# Patient Record
Sex: MALE | Race: BLACK OR AFRICAN AMERICAN | NOT HISPANIC OR LATINO | URBAN - METROPOLITAN AREA
[De-identification: names, ages, dates, MRNs, and addresses within clinical notes are randomized per-mention and may not be internally consistent; named-entity substitution may affect disease eponyms.]

---

## 2017-04-16 ENCOUNTER — EMERGENCY (EMERGENCY)
Facility: HOSPITAL | Age: 37
LOS: 1 days | Discharge: HOME | End: 2017-04-16
Attending: EMERGENCY MEDICINE | Admitting: EMERGENCY MEDICINE
Payer: MEDICAID

## 2017-04-16 VITALS
SYSTOLIC BLOOD PRESSURE: 147 MMHG | DIASTOLIC BLOOD PRESSURE: 87 MMHG | OXYGEN SATURATION: 99 % | RESPIRATION RATE: 18 BRPM | HEART RATE: 108 BPM | TEMPERATURE: 98 F

## 2017-04-16 DIAGNOSIS — Z79.2 LONG TERM (CURRENT) USE OF ANTIBIOTICS: ICD-10-CM

## 2017-04-16 DIAGNOSIS — S33.5XXA SPRAIN OF LIGAMENTS OF LUMBAR SPINE, INITIAL ENCOUNTER: ICD-10-CM

## 2017-04-16 DIAGNOSIS — F17.200 NICOTINE DEPENDENCE, UNSPECIFIED, UNCOMPLICATED: ICD-10-CM

## 2017-04-16 DIAGNOSIS — R10.9 UNSPECIFIED ABDOMINAL PAIN: ICD-10-CM

## 2017-04-16 PROCEDURE — 99283 EMERGENCY DEPT VISIT LOW MDM: CPT | Mod: 25
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
## 2017-04-16 NOTE — ED PROVIDER NOTE - MEDICAL DECISION MAKING DETAILS
pt with chronic back pain from  old wound, pt. admit not symptomatic at this time , need d/c, paper for readmission into shelter.

## 2017-05-17 ENCOUNTER — EMERGENCY (EMERGENCY)
Facility: HOSPITAL | Age: 37
LOS: 1 days | Discharge: LEFT BEFORE DISCHARGE | End: 2017-05-17
Attending: EMERGENCY MEDICINE | Admitting: EMERGENCY MEDICINE

## 2017-05-17 VITALS — TEMPERATURE: 98 F

## 2017-05-17 VITALS
DIASTOLIC BLOOD PRESSURE: 88 MMHG | HEART RATE: 113 BPM | OXYGEN SATURATION: 99 % | RESPIRATION RATE: 16 BRPM | SYSTOLIC BLOOD PRESSURE: 144 MMHG

## 2017-05-17 DIAGNOSIS — R07.89 OTHER CHEST PAIN: ICD-10-CM

## 2017-05-17 RX ORDER — QUETIAPINE FUMARATE 200 MG/1
0 TABLET, FILM COATED ORAL
Qty: 0 | Refills: 0 | COMMUNITY

## 2017-05-17 RX ORDER — DIVALPROEX SODIUM 500 MG/1
0 TABLET, DELAYED RELEASE ORAL
Qty: 0 | Refills: 0 | COMMUNITY
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
## 2017-06-20 ENCOUNTER — EMERGENCY (EMERGENCY)
Facility: HOSPITAL | Age: 37
LOS: 1 days | Discharge: HOME | End: 2017-06-20
Attending: EMERGENCY MEDICINE | Admitting: EMERGENCY MEDICINE
Payer: MEDICAID

## 2017-06-20 VITALS
HEIGHT: 69 IN | SYSTOLIC BLOOD PRESSURE: 121 MMHG | OXYGEN SATURATION: 97 % | HEART RATE: 69 BPM | WEIGHT: 189.6 LBS | DIASTOLIC BLOOD PRESSURE: 74 MMHG | TEMPERATURE: 99 F | RESPIRATION RATE: 15 BRPM

## 2017-06-20 DIAGNOSIS — Z79.899 OTHER LONG TERM (CURRENT) DRUG THERAPY: ICD-10-CM

## 2017-06-20 DIAGNOSIS — R41.82 ALTERED MENTAL STATUS, UNSPECIFIED: ICD-10-CM

## 2017-06-20 DIAGNOSIS — F10.129 ALCOHOL ABUSE WITH INTOXICATION, UNSPECIFIED: ICD-10-CM

## 2017-06-20 PROCEDURE — 99283 EMERGENCY DEPT VISIT LOW MDM: CPT

## 2017-06-21 VITALS
SYSTOLIC BLOOD PRESSURE: 131 MMHG | RESPIRATION RATE: 18 BRPM | DIASTOLIC BLOOD PRESSURE: 82 MMHG | TEMPERATURE: 98 F | OXYGEN SATURATION: 98 % | HEART RATE: 72 BPM
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
## 2017-07-11 ENCOUNTER — INPATIENT (INPATIENT)
Facility: HOSPITAL | Age: 37
LOS: 0 days | Discharge: LEFT BEFORE DISCHARGE | DRG: 178 | End: 2017-07-12
Attending: STUDENT IN AN ORGANIZED HEALTH CARE EDUCATION/TRAINING PROGRAM | Admitting: STUDENT IN AN ORGANIZED HEALTH CARE EDUCATION/TRAINING PROGRAM
Payer: COMMERCIAL

## 2017-07-11 VITALS
OXYGEN SATURATION: 96 % | SYSTOLIC BLOOD PRESSURE: 142 MMHG | DIASTOLIC BLOOD PRESSURE: 90 MMHG | HEART RATE: 135 BPM | RESPIRATION RATE: 14 BRPM | TEMPERATURE: 102 F

## 2017-07-11 DIAGNOSIS — R63.8 OTHER SYMPTOMS AND SIGNS CONCERNING FOOD AND FLUID INTAKE: ICD-10-CM

## 2017-07-11 DIAGNOSIS — Z29.9 ENCOUNTER FOR PROPHYLACTIC MEASURES, UNSPECIFIED: ICD-10-CM

## 2017-07-11 DIAGNOSIS — R41.82 ALTERED MENTAL STATUS, UNSPECIFIED: ICD-10-CM

## 2017-07-11 DIAGNOSIS — F31.9 BIPOLAR DISORDER, UNSPECIFIED: ICD-10-CM

## 2017-07-11 DIAGNOSIS — N17.9 ACUTE KIDNEY FAILURE, UNSPECIFIED: ICD-10-CM

## 2017-07-11 DIAGNOSIS — J69.0 PNEUMONITIS DUE TO INHALATION OF FOOD AND VOMIT: ICD-10-CM

## 2017-07-11 DIAGNOSIS — F19.10 OTHER PSYCHOACTIVE SUBSTANCE ABUSE, UNCOMPLICATED: ICD-10-CM

## 2017-07-11 LAB
ALBUMIN SERPL ELPH-MCNC: 3.7 G/DL — SIGNIFICANT CHANGE UP (ref 3.4–5)
ALP SERPL-CCNC: 74 U/L — SIGNIFICANT CHANGE UP (ref 40–120)
ALT FLD-CCNC: 16 U/L — SIGNIFICANT CHANGE UP (ref 12–42)
ANION GAP SERPL CALC-SCNC: 6 MMOL/L — LOW (ref 9–16)
APPEARANCE UR: CLEAR — SIGNIFICANT CHANGE UP
APTT BLD: 25.1 SEC — LOW (ref 27.5–36.5)
AST SERPL-CCNC: 24 U/L — SIGNIFICANT CHANGE UP (ref 15–37)
BASOPHILS NFR BLD AUTO: 0.3 % — SIGNIFICANT CHANGE UP (ref 0–2)
BILIRUB SERPL-MCNC: 0.3 MG/DL — SIGNIFICANT CHANGE UP (ref 0.2–1.2)
BILIRUB UR-MCNC: NEGATIVE — SIGNIFICANT CHANGE UP
BUN SERPL-MCNC: 14 MG/DL — SIGNIFICANT CHANGE UP (ref 7–23)
CALCIUM SERPL-MCNC: 8.5 MG/DL — SIGNIFICANT CHANGE UP (ref 8.5–10.5)
CHLORIDE SERPL-SCNC: 108 MMOL/L — SIGNIFICANT CHANGE UP (ref 96–108)
CO2 SERPL-SCNC: 28 MMOL/L — SIGNIFICANT CHANGE UP (ref 22–31)
COLOR SPEC: YELLOW — SIGNIFICANT CHANGE UP
CREAT SERPL-MCNC: 1.42 MG/DL — HIGH (ref 0.5–1.3)
DIFF PNL FLD: NEGATIVE — SIGNIFICANT CHANGE UP
EOSINOPHIL NFR BLD AUTO: 1.6 % — SIGNIFICANT CHANGE UP (ref 0–6)
GLUCOSE SERPL-MCNC: 108 MG/DL — HIGH (ref 70–99)
GLUCOSE UR QL: NEGATIVE — SIGNIFICANT CHANGE UP
HCT VFR BLD CALC: 41.4 % — SIGNIFICANT CHANGE UP (ref 39–50)
HGB BLD-MCNC: 14.6 G/DL — SIGNIFICANT CHANGE UP (ref 13–17)
IMM GRANULOCYTES NFR BLD AUTO: 0.5 % — SIGNIFICANT CHANGE UP (ref 0–1.5)
INR BLD: 0.96 — SIGNIFICANT CHANGE UP (ref 0.88–1.16)
KETONES UR-MCNC: NEGATIVE — SIGNIFICANT CHANGE UP
LACTATE SERPL-SCNC: 1.5 MMOL/L — SIGNIFICANT CHANGE UP (ref 0.4–2)
LEUKOCYTE ESTERASE UR-ACNC: NEGATIVE — SIGNIFICANT CHANGE UP
LIDOCAIN IGE QN: 106 U/L — SIGNIFICANT CHANGE UP (ref 73–393)
LYMPHOCYTES # BLD AUTO: 26.3 % — SIGNIFICANT CHANGE UP (ref 13–44)
MCHC RBC-ENTMCNC: 30.8 PG — SIGNIFICANT CHANGE UP (ref 27–34)
MCHC RBC-ENTMCNC: 35.3 G/DL — SIGNIFICANT CHANGE UP (ref 32–36)
MCV RBC AUTO: 87.3 FL — SIGNIFICANT CHANGE UP (ref 80–100)
MONOCYTES NFR BLD AUTO: 3.8 % — SIGNIFICANT CHANGE UP (ref 2–14)
NEUTROPHILS NFR BLD AUTO: 67.5 % — SIGNIFICANT CHANGE UP (ref 43–77)
NITRITE UR-MCNC: NEGATIVE — SIGNIFICANT CHANGE UP
PCP SPEC-MCNC: SIGNIFICANT CHANGE UP
PH UR: 5.5 — SIGNIFICANT CHANGE UP (ref 5–8)
PLATELET # BLD AUTO: 209 K/UL — SIGNIFICANT CHANGE UP (ref 150–400)
POTASSIUM SERPL-MCNC: 3.7 MMOL/L — SIGNIFICANT CHANGE UP (ref 3.5–5.3)
POTASSIUM SERPL-SCNC: 3.7 MMOL/L — SIGNIFICANT CHANGE UP (ref 3.5–5.3)
PROT SERPL-MCNC: 6.8 G/DL — SIGNIFICANT CHANGE UP (ref 6.4–8.2)
PROT UR-MCNC: NEGATIVE MG/DL — SIGNIFICANT CHANGE UP
PROTHROM AB SERPL-ACNC: 10.6 SEC — SIGNIFICANT CHANGE UP (ref 9.8–12.7)
RBC # BLD: 4.74 M/UL — SIGNIFICANT CHANGE UP (ref 4.2–5.8)
RBC # FLD: 12.2 % — SIGNIFICANT CHANGE UP (ref 10.3–16.9)
SODIUM SERPL-SCNC: 142 MMOL/L — SIGNIFICANT CHANGE UP (ref 132–145)
SP GR SPEC: 1.02 — SIGNIFICANT CHANGE UP (ref 1–1.03)
UROBILINOGEN FLD QL: 0.2 E.U./DL — SIGNIFICANT CHANGE UP
WBC # BLD: 5.7 K/UL — SIGNIFICANT CHANGE UP (ref 3.8–10.5)
WBC # FLD AUTO: 5.7 K/UL — SIGNIFICANT CHANGE UP (ref 3.8–10.5)

## 2017-07-11 PROCEDURE — 93010 ELECTROCARDIOGRAM REPORT: CPT

## 2017-07-11 PROCEDURE — 99222 1ST HOSP IP/OBS MODERATE 55: CPT | Mod: GC

## 2017-07-11 PROCEDURE — 99291 CRITICAL CARE FIRST HOUR: CPT | Mod: 25

## 2017-07-11 PROCEDURE — 71250 CT THORAX DX C-: CPT | Mod: 26

## 2017-07-11 PROCEDURE — 70450 CT HEAD/BRAIN W/O DYE: CPT | Mod: 26

## 2017-07-11 RX ORDER — SODIUM CHLORIDE 9 MG/ML
1000 INJECTION INTRAMUSCULAR; INTRAVENOUS; SUBCUTANEOUS
Qty: 0 | Refills: 0 | Status: DISCONTINUED | OUTPATIENT
Start: 2017-07-11 | End: 2017-07-12

## 2017-07-11 RX ORDER — SODIUM CHLORIDE 9 MG/ML
3 INJECTION INTRAMUSCULAR; INTRAVENOUS; SUBCUTANEOUS ONCE
Qty: 0 | Refills: 0 | Status: COMPLETED | OUTPATIENT
Start: 2017-07-11 | End: 2017-07-11

## 2017-07-11 RX ORDER — SODIUM CHLORIDE 9 MG/ML
500 INJECTION INTRAMUSCULAR; INTRAVENOUS; SUBCUTANEOUS ONCE
Qty: 0 | Refills: 0 | Status: COMPLETED | OUTPATIENT
Start: 2017-07-11 | End: 2017-07-11

## 2017-07-11 RX ORDER — SODIUM CHLORIDE 9 MG/ML
500 INJECTION INTRAMUSCULAR; INTRAVENOUS; SUBCUTANEOUS ONCE
Qty: 0 | Refills: 0 | Status: COMPLETED | OUTPATIENT
Start: 2017-07-11 | End: 2017-07-12

## 2017-07-11 RX ORDER — SODIUM CHLORIDE 9 MG/ML
500 INJECTION INTRAMUSCULAR; INTRAVENOUS; SUBCUTANEOUS
Qty: 0 | Refills: 0 | Status: COMPLETED | OUTPATIENT
Start: 2017-07-11 | End: 2017-07-11

## 2017-07-11 RX ORDER — HEPARIN SODIUM 5000 [USP'U]/ML
5000 INJECTION INTRAVENOUS; SUBCUTANEOUS EVERY 8 HOURS
Qty: 0 | Refills: 0 | Status: DISCONTINUED | OUTPATIENT
Start: 2017-07-11 | End: 2017-07-12

## 2017-07-11 RX ORDER — ACETAMINOPHEN 500 MG
650 TABLET ORAL EVERY 6 HOURS
Qty: 0 | Refills: 0 | Status: DISCONTINUED | OUTPATIENT
Start: 2017-07-11 | End: 2017-07-12

## 2017-07-11 RX ORDER — AMPICILLIN SODIUM AND SULBACTAM SODIUM 250; 125 MG/ML; MG/ML
INJECTION, POWDER, FOR SUSPENSION INTRAMUSCULAR; INTRAVENOUS
Qty: 0 | Refills: 0 | Status: DISCONTINUED | OUTPATIENT
Start: 2017-07-11 | End: 2017-07-12

## 2017-07-11 RX ORDER — ACETAMINOPHEN 500 MG
650 TABLET ORAL ONCE
Qty: 0 | Refills: 0 | Status: COMPLETED | OUTPATIENT
Start: 2017-07-11 | End: 2017-07-11

## 2017-07-11 RX ORDER — PIPERACILLIN AND TAZOBACTAM 4; .5 G/20ML; G/20ML
3.38 INJECTION, POWDER, LYOPHILIZED, FOR SOLUTION INTRAVENOUS ONCE
Qty: 0 | Refills: 0 | Status: COMPLETED | OUTPATIENT
Start: 2017-07-11 | End: 2017-07-11

## 2017-07-11 RX ORDER — AMPICILLIN SODIUM AND SULBACTAM SODIUM 250; 125 MG/ML; MG/ML
3 INJECTION, POWDER, FOR SUSPENSION INTRAMUSCULAR; INTRAVENOUS EVERY 6 HOURS
Qty: 0 | Refills: 0 | Status: DISCONTINUED | OUTPATIENT
Start: 2017-07-12 | End: 2017-07-12

## 2017-07-11 RX ORDER — AMPICILLIN SODIUM AND SULBACTAM SODIUM 250; 125 MG/ML; MG/ML
3 INJECTION, POWDER, FOR SUSPENSION INTRAMUSCULAR; INTRAVENOUS ONCE
Qty: 0 | Refills: 0 | Status: COMPLETED | OUTPATIENT
Start: 2017-07-11 | End: 2017-07-11

## 2017-07-11 RX ORDER — MIDAZOLAM HYDROCHLORIDE 1 MG/ML
2 INJECTION, SOLUTION INTRAMUSCULAR; INTRAVENOUS ONCE
Qty: 0 | Refills: 0 | Status: DISCONTINUED | OUTPATIENT
Start: 2017-07-11 | End: 2017-07-11

## 2017-07-11 RX ORDER — VANCOMYCIN HCL 1 G
1000 VIAL (EA) INTRAVENOUS ONCE
Qty: 0 | Refills: 0 | Status: COMPLETED | OUTPATIENT
Start: 2017-07-11 | End: 2017-07-11

## 2017-07-11 RX ADMIN — SODIUM CHLORIDE 2000 MILLILITER(S): 9 INJECTION INTRAMUSCULAR; INTRAVENOUS; SUBCUTANEOUS at 16:01

## 2017-07-11 RX ADMIN — SODIUM CHLORIDE 2000 MILLILITER(S): 9 INJECTION INTRAMUSCULAR; INTRAVENOUS; SUBCUTANEOUS at 15:31

## 2017-07-11 RX ADMIN — AMPICILLIN SODIUM AND SULBACTAM SODIUM 200 GRAM(S): 250; 125 INJECTION, POWDER, FOR SUSPENSION INTRAMUSCULAR; INTRAVENOUS at 23:13

## 2017-07-11 RX ADMIN — PIPERACILLIN AND TAZOBACTAM 200 GRAM(S): 4; .5 INJECTION, POWDER, LYOPHILIZED, FOR SOLUTION INTRAVENOUS at 15:50

## 2017-07-11 RX ADMIN — SODIUM CHLORIDE 2000 MILLILITER(S): 9 INJECTION INTRAMUSCULAR; INTRAVENOUS; SUBCUTANEOUS at 22:01

## 2017-07-11 RX ADMIN — Medication 650 MILLIGRAM(S): at 15:53

## 2017-07-11 RX ADMIN — SODIUM CHLORIDE 2000 MILLILITER(S): 9 INJECTION INTRAMUSCULAR; INTRAVENOUS; SUBCUTANEOUS at 15:47

## 2017-07-11 RX ADMIN — Medication 250 MILLIGRAM(S): at 16:30

## 2017-07-11 RX ADMIN — SODIUM CHLORIDE 2000 MILLILITER(S): 9 INJECTION INTRAMUSCULAR; INTRAVENOUS; SUBCUTANEOUS at 15:30

## 2017-07-11 RX ADMIN — SODIUM CHLORIDE 3 MILLILITER(S): 9 INJECTION INTRAMUSCULAR; INTRAVENOUS; SUBCUTANEOUS at 15:30

## 2017-07-11 RX ADMIN — SODIUM CHLORIDE 2000 MILLILITER(S): 9 INJECTION INTRAMUSCULAR; INTRAVENOUS; SUBCUTANEOUS at 15:45

## 2017-07-11 RX ADMIN — SODIUM CHLORIDE 2000 MILLILITER(S): 9 INJECTION INTRAMUSCULAR; INTRAVENOUS; SUBCUTANEOUS at 15:36

## 2017-07-11 RX ADMIN — MIDAZOLAM HYDROCHLORIDE 2 MILLIGRAM(S): 1 INJECTION, SOLUTION INTRAMUSCULAR; INTRAVENOUS at 15:25

## 2017-07-11 NOTE — H&P ADULT - NSHPPHYSICALEXAM_GEN_ALL_CORE
.  VITAL SIGNS:  T(C): 36.8 (07-11-17 @ 21:52), Max: 38.7 (07-11-17 @ 15:24)  T(F): 98.2 (07-11-17 @ 21:52), Max: 101.7 (07-11-17 @ 15:24)  HR: 58 (07-11-17 @ 21:52) (58 - 135)  BP: 84/57 (07-11-17 @ 21:52) (84/57 - 142/90)  BP(mean): --  RR: 19 (07-11-17 @ 21:52) (14 - 19)  SpO2: 95% (07-11-17 @ 21:52) (94% - 96%)  Wt(kg): --    PHYSICAL EXAM:    Constitutional: very lethargic, but arousable to physical stimuli  Head: NC/AT  Eyes: PERRL, EOMI, clear conjunctiva  ENT: no nasal discharge; uvula midline, no oropharyngeal erythema or exudates; MMM  Neck: supple  Respiratory: decreased breath sounds at bases   Cardiac: +S1/S2; RRR; no M/R/G  Gastrointestinal: soft, NT/ND; no rebound or guarding; +BSx4  Extremities: WWP, no clubbing or cyanosis; no peripheral edema  Musculoskeletal: NROM x4; no joint swelling, tenderness or erythema  Vascular: 2+ radial, DP pulses B/L  Dermatologic: skin warm, dry and intact; abrasions to L elbow, healing wound frontal area   Neurologic: AAOx2; CNII-XII grossly intact; no focal deficits  Psychiatric: affect and characteristics of appearance, verbalizations, behaviors are appropriate
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
## 2017-07-11 NOTE — H&P ADULT - PROBLEM SELECTOR PLAN 1
Pt brought in by EMS for AMS and foaming at mouth per bystanders. Likely toxic metaoblic as Utox +benzos, THC, and cocaine and pt reports doing K2. U    -EEG to r/o seizure activity Pt brought in by EMS for AMS and foaming at mouth per bystanders. Likely toxic metabolic as Utox +benzos, THC, and cocaine and pt reports doing K2 vs seizure activity. Pt reports taking Depakote/Seroquel for bipolar disorder, however unclear if any other psych illness. Unlikely meningitis as pts mental status improving and no signs of nuchal rigidity on exam.    -Pt very lethargic upon arrival to floor, could be 2/2 Versed give at [facility] for agitation and combativeness.  -Monitor mental status   -f/u Depakote level   -EEG to r/o seizure activity  -Consider LP if pt still febrile, worsening mental status, and exhibits signs of nuchal rigidity  -Obtain collateral on from PMD and pharmacy in AM Pt brought in by EMS for AMS and foaming at mouth per bystanders. Likely toxic metabolic as Utox +benzos, THC, and cocaine and pt reports doing K2 vs seizure activity. Pt reports taking Depakote/Seroquel for bipolar disorder, however unclear if any other psych illness. Unlikely meningitis as pts mental status improving and no signs of nuchal rigidity on exam.    -Pt very lethargic upon arrival to floor, could be 2/2 Versed given at [facility] for agitation and combativeness.   -Monitor for worsening mental status   -f/u Depakote level   -EEG to r/o seizure activity  -Consider LP if pt still febrile, worsening mental status, and exhibits signs of nuchal rigidity  -Obtain collateral on from PMD and pharmacy in AM Pt brought in by EMS for AMS and foaming at mouth per bystanders. Likely toxic metabolic as Utox +benzos, THC, and cocaine and pt reports doing K2 vs seizure activity. Pt reports taking Depakote/Seroquel for bipolar disorder, however unclear if any other psych illness. Unlikely meningitis as pts mental status improving and no signs of nuchal rigidity on exam.    -Pt very lethargic upon arrival to floor, could be 2/2 Versed given at [facility] for agitation and combativeness.   -f/u Depakote level   -EEG to r/o seizure activity  -Consider LP if pt still febrile, worsening mental status, and exhibits signs of nuchal rigidity  -Obtain collateral on from PMD and pharmacy in AM

## 2017-07-11 NOTE — ED PROVIDER NOTE - CRITICAL CARE PROVIDED
interpretation of diagnostic studies/documentation/additional history taking/consultation with other physicians/direct patient care (not related to procedure)
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
## 2017-07-11 NOTE — H&P ADULT - PROBLEM SELECTOR PLAN 5
HSQ    Dispo: Admit to F Pt reports taking Seroquel and Depakote for bipolar disorder.  -Obtain collateral in AM from PMD or pharmacy

## 2017-07-11 NOTE — H&P ADULT - PROBLEM SELECTOR PLAN 2
-s/p Vanc/Zosyn at [facility]  -Start Unasyn 3mg q6  -Tylenol 650mg suppository for temp >100.4 Pt meeting sepsis criteria at [facility] w/temp of 101.7 and , lactate negative. CT chest showed bilateral dependent densities in both lower lobes, left greater than right, possibly due to aspiration.   -s/p Vanc/Zosyn at [facility]  -Start Unasyn 3mg q6 for asp pna   -Tylenol 650mg suppository for temp >100.4  -Monitor temp and WBC Pt initially meeting sepsis criteria at [facility] w/temp of 101.7 and , lactate negative. SBP of 80s upon arrival to [facility], given 500cc bolus with improvement of SBP to 90s.   -CT chest showed bilateral dependent densities in both lower lobes, left greater than right, possibly due to aspiration.   -s/p Vanc/Zosyn at [facility]  -Start Unasyn 3mg q6 for asp pna   -C/w IVF 100cc/hr   -Tylenol 650mg suppository for temp >100.4  -Monitor temp and WBC Pt initially meeting sepsis criteria at [facility] w/temp of 101.7 and , lactate negative. SBP of 80s upon arrival to [facility], given 500cc bolus with improvement of SBP to 90s.   -CT chest showed bilateral dependent densities in both lower lobes, left greater than right, possibly due to aspiration.   -s/p Vanc/Zosyn at [facility]  -Start Unasyn 3mg q6 for asp pna   -C/w IVF 100cc/hr   -f/u blood cx  -Tylenol 650mg suppository for temp >100.4  -Monitor temp and WBC

## 2017-07-11 NOTE — H&P ADULT - PROBLEM SELECTOR PLAN 4
NPO  IVF   Replete musa Pt reports K2 use, however Utox +benzos, cocaine, THC.  -Monitor for benzo withdrawal

## 2017-07-11 NOTE — H&P ADULT - ATTENDING COMMENTS
pt seen and examined; reviewed vs, labs, CXR; hx limited as pt was somnolent   PE findings as above except pt though somnolent, able to wake up to verbal stimuli, minimally cooperative with exam (following simple commands)  a/p:   1. AMS: in setting of polysubstance use and/or seizure; vEEG pending  2. aspiration PNA/ sepsis: On unasyn, IVFs  3. MADDIE: on IVF, follow up urine lytes, monitor renal fxn  4. clarify Bipolar medications with pharmacy and/or psych

## 2017-07-11 NOTE — ED PROVIDER NOTE - CONSTITUTIONAL, MLM
normal... Well appearing, well nourished, awake, alert, oriented to person, place, time/situation and in no apparent distress. See HPI.  Pt's alertness waxes and wanes from being obtunded to extremely agitated.  Localizes pain but unable to converse.  Actively coughing and O2 sat seen to go as low as 91% on RA.

## 2017-07-11 NOTE — H&P ADULT - ASSESSMENT
39[facility] and possible seizure activity possibly secondary to substance abuse and now with aspiration PNA

## 2017-07-11 NOTE — H&P ADULT - NSHPLABSRESULTS_GEN_ALL_CORE
.  LABS:                         14.6   5.7   )-----------( 209      ( 2017 15:39 )             41.4         142  |  108  |  14  ----------------------------<  108<H>  3.7   |  28  |  1.42<H>    Ca    8.5      2017 15:39    TPro  6.8  /  Alb  3.7  /  TBili  0.3  /  DBili  x   /  AST  24  /  ALT  16  /  AlkPhos  74  0711    PT/INR - ( 2017 15:39 )   PT: 10.6 sec;   INR: 0.96          PTT - ( 2017 15:39 )  PTT:25.1 sec  Urinalysis Basic - ( 2017 18:54 )    Color: Yellow / Appearance: Clear / S.025 / pH: x  Gluc: x / Ketone: NEGATIVE  / Bili: NEGATIVE / Urobili: 0.2 E.U./dL   Blood: x / Protein: NEGATIVE mg/dL / Nitrite: NEGATIVE   Leuk Esterase: NEGATIVE / RBC: x / WBC x   Sq Epi: x / Non Sq Epi: x / Bacteria: x            Lactate, Blood: 1.5 mmoL/L ( @ 15:39)      RADIOLOGY, EKG & ADDITIONAL TESTS: Reviewed.

## 2017-07-11 NOTE — ED PROVIDER NOTE - MEDICAL DECISION MAKING DETAILS
Concern for possible drug use given level of agitation and AMS.  Possible szr.  Likely aspiration given presentation.  Will place IVs, full labs including cultures, EKG, broad spectrum abx, HCT given AMS, will CT chest to r/o aspiration.

## 2017-07-11 NOTE — H&P ADULT - PROBLEM SELECTOR PLAN 3
Pt reports taking seroquel and depakote for bipolar disorder.  -Obtain collateral in AM from PMD or pharmacy Pt with elevated Cr of 1.42. Unknown baseline   -Trend Cr  -C/w IVF as above   -Check Ulytes  -Consider renal US if Cr trending up

## 2017-07-11 NOTE — ED PROVIDER NOTE - PROGRESS NOTE DETAILS
Case was discussed with Dr. Aguilar and the [facility] at [facility].  Will admit for aspiration PNA, fever, and drug abuse +/- szr.

## 2017-07-11 NOTE — ED ADULT NURSE REASSESSMENT NOTE - CONDITION
Received patient asleep and easilly aroused in bed and on cardiac monitor.  Wrist restraints on wrist, but not connected to or tied to the bed.  patient unrestrained when received and evaluated by me.  No agitation noted at this time.  Awaiting transport to hospital bed.

## 2017-07-11 NOTE — ED ADULT TRIAGE NOTE - CHIEF COMPLAINT QUOTE
Per EMS and [employer], patient found on street "Foaming from the mouth". Patient vomited x1 PTA. On arrival, alert to painful stimuli only. Hot to the touch. No obvious injuries noted. Placed in RM# 10.

## 2017-07-12 VITALS
TEMPERATURE: 97 F | RESPIRATION RATE: 15 BRPM | SYSTOLIC BLOOD PRESSURE: 112 MMHG | DIASTOLIC BLOOD PRESSURE: 76 MMHG | HEART RATE: 66 BPM | OXYGEN SATURATION: 97 %

## 2017-07-12 LAB
ANION GAP SERPL CALC-SCNC: 10 MMOL/L — SIGNIFICANT CHANGE UP (ref 5–17)
BASOPHILS NFR BLD AUTO: 0.5 % — SIGNIFICANT CHANGE UP (ref 0–2)
BUN SERPL-MCNC: 9 MG/DL — SIGNIFICANT CHANGE UP (ref 7–23)
CALCIUM SERPL-MCNC: 7.9 MG/DL — LOW (ref 8.4–10.5)
CHLORIDE SERPL-SCNC: 112 MMOL/L — HIGH (ref 96–108)
CO2 SERPL-SCNC: 23 MMOL/L — SIGNIFICANT CHANGE UP (ref 22–31)
CREAT SERPL-MCNC: 1.2 MG/DL — SIGNIFICANT CHANGE UP (ref 0.5–1.3)
CULTURE RESULTS: NO GROWTH — SIGNIFICANT CHANGE UP
EOSINOPHIL NFR BLD AUTO: 4 % — SIGNIFICANT CHANGE UP (ref 0–6)
GLUCOSE SERPL-MCNC: 81 MG/DL — SIGNIFICANT CHANGE UP (ref 70–99)
HCT VFR BLD CALC: 40 % — SIGNIFICANT CHANGE UP (ref 39–50)
HGB BLD-MCNC: 13.4 G/DL — SIGNIFICANT CHANGE UP (ref 13–17)
LYMPHOCYTES # BLD AUTO: 35 % — SIGNIFICANT CHANGE UP (ref 13–44)
MAGNESIUM SERPL-MCNC: 1.8 MG/DL — SIGNIFICANT CHANGE UP (ref 1.6–2.6)
MCHC RBC-ENTMCNC: 29.5 PG — SIGNIFICANT CHANGE UP (ref 27–34)
MCHC RBC-ENTMCNC: 33.5 G/DL — SIGNIFICANT CHANGE UP (ref 32–36)
MCV RBC AUTO: 88.1 FL — SIGNIFICANT CHANGE UP (ref 80–100)
MONOCYTES NFR BLD AUTO: 6.1 % — SIGNIFICANT CHANGE UP (ref 2–14)
NEUTROPHILS NFR BLD AUTO: 54.4 % — SIGNIFICANT CHANGE UP (ref 43–77)
OSMOLALITY UR: 420 MOSMOL/KG — SIGNIFICANT CHANGE UP (ref 100–650)
PHOSPHATE SERPL-MCNC: 2.6 MG/DL — SIGNIFICANT CHANGE UP (ref 2.5–4.5)
PLATELET # BLD AUTO: 154 K/UL — SIGNIFICANT CHANGE UP (ref 150–400)
POTASSIUM SERPL-MCNC: 4 MMOL/L — SIGNIFICANT CHANGE UP (ref 3.5–5.3)
POTASSIUM SERPL-SCNC: 4 MMOL/L — SIGNIFICANT CHANGE UP (ref 3.5–5.3)
RBC # BLD: 4.54 M/UL — SIGNIFICANT CHANGE UP (ref 4.2–5.8)
RBC # FLD: 12.8 % — SIGNIFICANT CHANGE UP (ref 10.3–16.9)
SODIUM SERPL-SCNC: 145 MMOL/L — SIGNIFICANT CHANGE UP (ref 135–145)
SODIUM UR-SCNC: 58 MMOL/L — SIGNIFICANT CHANGE UP
SPECIMEN SOURCE: SIGNIFICANT CHANGE UP
UUN UR-MCNC: 466 MG/DL — SIGNIFICANT CHANGE UP
VALPROATE SERPL-MCNC: <3 UG/ML — LOW (ref 50–100)
WBC # BLD: 4.2 K/UL — SIGNIFICANT CHANGE UP (ref 3.8–10.5)
WBC # FLD AUTO: 4.2 K/UL — SIGNIFICANT CHANGE UP (ref 3.8–10.5)

## 2017-07-12 RX ORDER — MAGNESIUM SULFATE 500 MG/ML
1 VIAL (ML) INJECTION ONCE
Qty: 0 | Refills: 0 | Status: COMPLETED | OUTPATIENT
Start: 2017-07-12 | End: 2017-07-12

## 2017-07-12 RX ORDER — SODIUM CHLORIDE 9 MG/ML
1000 INJECTION, SOLUTION INTRAVENOUS
Qty: 0 | Refills: 0 | Status: DISCONTINUED | OUTPATIENT
Start: 2017-07-12 | End: 2017-07-12

## 2017-07-12 RX ADMIN — Medication 100 GRAM(S): at 10:01

## 2017-07-12 RX ADMIN — SODIUM CHLORIDE 100 MILLILITER(S): 9 INJECTION INTRAMUSCULAR; INTRAVENOUS; SUBCUTANEOUS at 01:00

## 2017-07-12 RX ADMIN — SODIUM CHLORIDE 3000 MILLILITER(S): 9 INJECTION INTRAMUSCULAR; INTRAVENOUS; SUBCUTANEOUS at 00:00

## 2017-07-12 RX ADMIN — HEPARIN SODIUM 5000 UNIT(S): 5000 INJECTION INTRAVENOUS; SUBCUTANEOUS at 06:30

## 2017-07-12 RX ADMIN — SODIUM CHLORIDE 100 MILLILITER(S): 9 INJECTION, SOLUTION INTRAVENOUS at 10:02

## 2017-07-12 RX ADMIN — AMPICILLIN SODIUM AND SULBACTAM SODIUM 200 GRAM(S): 250; 125 INJECTION, POWDER, FOR SUSPENSION INTRAMUSCULAR; INTRAVENOUS at 06:30

## 2017-07-12 NOTE — DISCHARGE NOTE ADULT - PATIENT PORTAL LINK FT
“You can access the [facility] Patient Portal, offered by [facility], by registering with the following website: [email or web address]”

## 2017-07-12 NOTE — DISCHARGE NOTE ADULT - HOSPITAL COURSE
39M w/?bipolar disorder, brought to [facility] by [facility] and [facility] for AMS after being found on street and foaming from mouth and possible seizure like activity per bystanders. Pt met SIRS criteria as well w/ dependent opacities on CT. Was treated w/ unasyn for possible aspiration PNA. The following morning, pt was AAOx3. Pt opted to sign out AMA before any further workup could be done. Risks were explained at length to patient including possible death. Pt stated he accepted the risks and still wished to sign out AMA on 7/12. Pt was instructed to f/u w/ his doctors and addiction center that he goes to.

## 2017-07-12 NOTE — DISCHARGE NOTE ADULT - CARE PLAN
Principal Discharge DX:	Altered mental state  Goal:	Resolution  Instructions for follow-up, activity and diet:	You presented to the hospital w/ an alteration in your mental status. It is thought this is due to substance abuse/intoxication. It resolved. Seizure was also a possible cause but you signed out against medical advice before it could be completed. Please follow up with your doctor. If you do not have one, you can establish care at the [facility] at [address] [place] [address] and [address] [place] [address] ([facility] [address] [place] [address])  Secondary Diagnosis:	MADDIE (acute kidney injury)  Instructions for follow-up, activity and diet:	You were diagnosed with an injury to your kidney likely due to decreased intake of fluids. You were treated with IV fluids and it was improving. Please drink plenty of water and follow up with your doctor.  Secondary Diagnosis:	Bipolar disorder  Instructions for follow-up, activity and diet:	You have a history of bipolar disorder. You take Depakote and Seroquel. Per records, you follow with Dr. Monique Muro. Please follow up with her.  Secondary Diagnosis:	Substance abuse  Instructions for follow-up, activity and diet:	You have a history of substance abuse and current substance abuse believed to have contributed to your altered mental state. Your urine toxicology was positive for benzodiazepenes, cocaine, and THC (cannabis). Please abstain from using illicit substances and continue your treatment at the [facility].  Secondary Diagnosis:	Nutrition, metabolism, and development symptoms  Instructions for follow-up, activity and diet:	Please drink plenty of fluids and eat a well balanced diet. Also, please take all medications as they are prescribed by your doctor. Principal Discharge DX:	Altered mental state  Goal:	Resolution  Instructions for follow-up, activity and diet:	You presented to the hospital w/ an alteration in your mental status. It is thought this is due to substance abuse/intoxication. It resolved. Seizure was also a possible cause but you signed out against medical advice before it could be completed. Please follow up with your doctor. If you do not have one, you can establish care at the [facility] at [address] [place] [address] and [address] [place] [address] ([facility] [address] [place] [address])  Secondary Diagnosis:	MADDIE (acute kidney injury)  Instructions for follow-up, activity and diet:	You were diagnosed with an injury to your kidney likely due to decreased intake of fluids. You were treated with IV fluids and it was improving. Please drink plenty of water and follow up with your doctor.  Secondary Diagnosis:	Bipolar disorder  Instructions for follow-up, activity and diet:	You have a history of bipolar disorder. You take Depakote and Seroquel. Per records, you follow with Dr. Monique Muro. Please follow up with her.  Secondary Diagnosis:	Substance abuse  Instructions for follow-up, activity and diet:	You have a history of substance abuse and current substance abuse believed to have contributed to your altered mental state. Your urine toxicology was positive for benzodiazepenes, cocaine, and THC (cannabis). Please abstain from using illicit substances and continue your treatment at the [facility].  Secondary Diagnosis:	Nutrition, metabolism, and development symptoms  Instructions for follow-up, activity and diet:	Please drink plenty of fluids and eat a well balanced diet. Also, please take all medications as they are prescribed by your doctor.

## 2017-07-12 NOTE — DISCHARGE NOTE ADULT - ADDITIONAL INSTRUCTIONS
Please follow up with your doctor, Dr. Monique Muro, as well as at the [facility] center. If you do not have a PMD, please establish care and follow up with one.

## 2017-07-12 NOTE — PROGRESS NOTE ADULT - SUBJECTIVE AND OBJECTIVE BOX
OVERNIGHT EVENTS:     SUBJECTIVE / INTERVAL HPI: Patient seen and examined at bedside. Pt. reports not remembering any events that occurred last night. He reports coming back into awareness early this morning. He reports having smoked K2 last night, possibly more than usual as well as drinking Mac Amanda. Pt. takes Depakote and Seroquel at home, last took his meds 3 days ago and reports taking them every day.     VITAL SIGNS:  Vital Signs Last 24 Hrs  T(C): 36.2 (2017 08:49), Max: 38.7 (2017 15:24)  T(F): 97.2 (2017 08:49), Max: 101.7 (2017 15:24)  HR: 66 (2017 08:49) (53 - 135)  BP: 112/76 (2017 08:49) (84/57 - 142/90)  BP(mean): --  RR: 15 (2017 08:49) (14 - 19)  SpO2: 97% (2017 08:49) (94% - 97%)    PHYSICAL EXAM:    General: WDWN male resting comfortably in bed   HEENT: NC/AT; PERRL, anicteric sclera; MMM  Neck: supple  Cardiovascular: +S1/S2; bradycardic, regular rhythm.  Respiratory: CTA B/L; no W/R/R  Gastrointestinal: soft, NT/ND; +BSx4  Extremities: WWP; no edema, clubbing or cyanosis  Vascular: 2+ radial, DP/PT pulses B/L  Neurological: AAOx3; no focal deficits    MEDICATIONS:  MEDICATIONS  (STANDING):  heparin  Injectable 5000 Unit(s) SubCutaneous every 8 hours  ampicillin/sulbactam  IVPB   IV Intermittent   sodium chloride 0.9%. 1000 milliLiter(s) (100 mL/Hr) IV Continuous <Continuous>  ampicillin/sulbactam  IVPB 3 Gram(s) IV Intermittent every 6 hours    MEDICATIONS  (PRN):  acetaminophen  Suppository 650 milliGRAM(s) Rectal every 6 hours PRN For Temp greater than 38 C (100.4 F)      ALLERGIES:  Allergies    No Known Allergies    Intolerances    LABS:                        13.4   4.2   )-----------( 154      ( 2017 07:40 )             40.0     07-12    145  |  112<H>  |  9   ----------------------------<  81  4.0   |  23  |  1.20    Ca    7.9<L>      2017 07:40  Phos  2.6     07-  Mg     1.8     -    TPro  6.8  /  Alb  3.7  /  TBili  0.3  /  DBili  x   /  AST  24  /  ALT  16  /  AlkPhos  74  07-11    PT/INR - ( 2017 15:39 )   PT: 10.6 sec;   INR: 0.96          PTT - ( 2017 15:39 )  PTT:25.1 sec  Urinalysis Basic - ( 2017 18:54 )    Color: Yellow / Appearance: Clear / S.025 / pH: x  Gluc: x / Ketone: NEGATIVE  / Bili: NEGATIVE / Urobili: 0.2 E.U./dL   Blood: x / Protein: NEGATIVE mg/dL / Nitrite: NEGATIVE   Leuk Esterase: NEGATIVE / RBC: x / WBC x   Sq Epi: x / Non Sq Epi: x / Bacteria: x      CAPILLARY BLOOD GLUCOSE  99 (2017 15:42)          RADIOLOGY & ADDITIONAL TESTS: Reviewed. OVERNIGHT EVENTS:     SUBJECTIVE / INTERVAL HPI: Patient seen and examined at bedside. Pt. reports not remembering any events that occurred last night. He reports coming back into awareness early this morning. He reports having smoked K2 last night, possibly more than usual as well as drinking Mac Amanda. Pt. takes Depakote and Seroquel at home, last took his meds 3 days ago and reports taking them every day. Spoke with the [facility] where the patient follows up for addiction. Per a counselor at the [facility], they report that he uses 2 blunts of K2 daily, 1/2 g of cannabis daily, 1 pint of alcohol q3 days, as well as crack/cocaine. They have in their notes that he takes Depakote and Zeprexa for bipolar disorder. He missed his last appointment with the psychiatrist.     VITAL SIGNS:  Vital Signs Last 24 Hrs  T(C): 36.2 (2017 08:49), Max: 38.7 (2017 15:24)  T(F): 97.2 (2017 08:49), Max: 101.7 (2017 15:24)  HR: 66 (2017 08:49) (53 - 135)  BP: 112/76 (2017 08:49) (84/57 - 142/90)  BP(mean): --  RR: 15 (2017 08:49) (14 - 19)  SpO2: 97% (2017 08:49) (94% - 97%)    PHYSICAL EXAM:    General: WDWN male resting comfortably in bed   HEENT: NC/AT; PERRL, anicteric sclera; MMM  Neck: supple  Cardiovascular: +S1/S2; bradycardic, regular rhythm.  Respiratory: CTA B/L; no W/R/R  Gastrointestinal: soft, NT/ND; +BSx4  Extremities: WWP; no edema, clubbing or cyanosis  Vascular: 2+ radial, DP/PT pulses B/L  Neurological: AAOx3; no focal deficits    MEDICATIONS:  MEDICATIONS  (STANDING):  heparin  Injectable 5000 Unit(s) SubCutaneous every 8 hours  ampicillin/sulbactam  IVPB   IV Intermittent   sodium chloride 0.9%. 1000 milliLiter(s) (100 mL/Hr) IV Continuous <Continuous>  ampicillin/sulbactam  IVPB 3 Gram(s) IV Intermittent every 6 hours    MEDICATIONS  (PRN):  acetaminophen  Suppository 650 milliGRAM(s) Rectal every 6 hours PRN For Temp greater than 38 C (100.4 F)      ALLERGIES:  Allergies    No Known Allergies    Intolerances    LABS:                        13.4   4.2   )-----------( 154      ( 2017 07:40 )             40.0     07-12    145  |  112<H>  |  9   ----------------------------<  81  4.0   |  23  |  1.20    Ca    7.9<L>      2017 07:40  Phos  2.6     07-  Mg     1.8     -12    TPro  6.8  /  Alb  3.7  /  TBili  0.3  /  DBili  x   /  AST  24  /  ALT  16  /  AlkPhos  74  07-11    PT/INR - ( 2017 15:39 )   PT: 10.6 sec;   INR: 0.96          PTT - ( 2017 15:39 )  PTT:25.1 sec  Urinalysis Basic - ( 2017 18:54 )    Color: Yellow / Appearance: Clear / S.025 / pH: x  Gluc: x / Ketone: NEGATIVE  / Bili: NEGATIVE / Urobili: 0.2 E.U./dL   Blood: x / Protein: NEGATIVE mg/dL / Nitrite: NEGATIVE   Leuk Esterase: NEGATIVE / RBC: x / WBC x   Sq Epi: x / Non Sq Epi: x / Bacteria: x      CAPILLARY BLOOD GLUCOSE  99 (2017 15:42)          RADIOLOGY & ADDITIONAL TESTS: Reviewed. OVERNIGHT EVENTS:     SUBJECTIVE / INTERVAL HPI: Patient seen and examined at bedside. Pt. reports not remembering any events that occurred last night. He reports coming back into awareness early this morning. He reports having smoked K2 last night, possibly more than usual as well as drinking Mac Amanda. Pt. takes Depakote and Seroquel at home, last took his meds 3 days ago and reports taking them every day. Spoke with the [facility] where the patient follows up for addiction. Per a counselor at the [facility], they report that he uses 2 blunts of K2 daily, 1/2 g of cannabis daily, 1 pint of alcohol q3 days, as well as crack/cocaine. They have in their notes that he takes Depakote and Zeprexa for bipolar disorder. He missed his last appointment with the psychiatrist. Requesting to sign out AMA.     VITAL SIGNS:  Vital Signs Last 24 Hrs  T(C): 36.2 (2017 08:49), Max: 38.7 (2017 15:24)  T(F): 97.2 (2017 08:49), Max: 101.7 (2017 15:24)  HR: 66 (2017 08:49) (53 - 135)  BP: 112/76 (2017 08:49) (84/57 - 142/90)  BP(mean): --  RR: 15 (2017 08:49) (14 - 19)  SpO2: 97% (2017 08:49) (94% - 97%)    PHYSICAL EXAM:    General: WDWN male resting comfortably in bed   HEENT: NC/AT; PERRL, anicteric sclera; MMM  Neck: supple  Cardiovascular: +S1/S2; bradycardic, regular rhythm.  Respiratory: CTA B/L; no W/R/R  Gastrointestinal: soft, NT/ND; +BSx4  Extremities: WWP; no edema, clubbing or cyanosis  Vascular: 2+ radial, DP/PT pulses B/L  Neurological: AAOx3; no focal deficits    MEDICATIONS:  MEDICATIONS  (STANDING):  heparin  Injectable 5000 Unit(s) SubCutaneous every 8 hours  ampicillin/sulbactam  IVPB   IV Intermittent   sodium chloride 0.9%. 1000 milliLiter(s) (100 mL/Hr) IV Continuous <Continuous>  ampicillin/sulbactam  IVPB 3 Gram(s) IV Intermittent every 6 hours    MEDICATIONS  (PRN):  acetaminophen  Suppository 650 milliGRAM(s) Rectal every 6 hours PRN For Temp greater than 38 C (100.4 F)      ALLERGIES:  Allergies    No Known Allergies    Intolerances    LABS:                        13.4   4.2   )-----------( 154      ( 2017 07:40 )             40.0     07-12    145  |  112<H>  |  9   ----------------------------<  81  4.0   |  23  |  1.20    Ca    7.9<L>      2017 07:40  Phos  2.6     07-12  Mg     1.8     -12    TPro  6.8  /  Alb  3.7  /  TBili  0.3  /  DBili  x   /  AST  24  /  ALT  16  /  AlkPhos  74  07-11    PT/INR - ( 2017 15:39 )   PT: 10.6 sec;   INR: 0.96          PTT - ( 2017 15:39 )  PTT:25.1 sec  Urinalysis Basic - ( 2017 18:54 )    Color: Yellow / Appearance: Clear / S.025 / pH: x  Gluc: x / Ketone: NEGATIVE  / Bili: NEGATIVE / Urobili: 0.2 E.U./dL   Blood: x / Protein: NEGATIVE mg/dL / Nitrite: NEGATIVE   Leuk Esterase: NEGATIVE / RBC: x / WBC x   Sq Epi: x / Non Sq Epi: x / Bacteria: x      CAPILLARY BLOOD GLUCOSE  99 (2017 15:42)          RADIOLOGY & ADDITIONAL TESTS: Reviewed.

## 2017-07-12 NOTE — DISCHARGE NOTE ADULT - MEDICATION SUMMARY - MEDICATIONS TO TAKE
I will START or STAY ON the medications listed below when I get home from the hospital:    Depakote  mg oral tablet, extended release  -- 1 tab(s) by mouth 2 times a day  -- Indication: For Bipolar disorder    SEROquel 200 mg oral tablet  -- 1 tab(s) by mouth 2 times a day  -- Indication: For Bipolar disorder

## 2017-07-12 NOTE — DISCHARGE NOTE ADULT - PROVIDER TOKENS
TOKEN:'[id number]',FREE:[LAST:[[facility]],PHONE:[(   )    -],FAX:[(   )    -]]

## 2017-07-12 NOTE — PROGRESS NOTE ADULT - PROBLEM SELECTOR PLAN 3
- Pt states he takes Seroquel and Depakote for bipolar disorder, he states this was recently diagnosed.   - Depakote level <3 - patient notes he is compliant and follows with a psychiatrist   - Collateral from pharmacy confirmed doses of - Pt states he takes Seroquel and Depakote for bipolar disorder, he states this was recently diagnosed.   - Depakote level <3 - patient notes he is compliant and follows with a psychiatrist   - Collateral from pharmacy confirmed doses of Depakote 500mg ER Po BID and Seroquel 200mg PO BID

## 2017-07-12 NOTE — PROGRESS NOTE ADULT - ASSESSMENT
38 y/o male with history of bipolar disorder was brought [facility] yesterday evening after being found altered on the street with possible seizure activity 2/2 substance abuse and found to have aspiration PNA.

## 2017-07-12 NOTE — DISCHARGE NOTE ADULT - CARE PROVIDER_API CALL
Monique Muro (DO), Psych  IP Psych  [address] [address]  Phone: [phone]  Fax: [phone]    [facility],   Phone: (   )    -  Fax: (   )    -

## 2017-07-12 NOTE — DISCHARGE NOTE ADULT - PLAN OF CARE
Resolution You presented to the hospital w/ an alteration in your mental status. It is thought this is due to substance abuse/intoxication. It resolved. Seizure was also a possible cause but you signed out against medical advice before it could be completed. Please follow up with your doctor. If you do not have one, you can establish care at the [facility] at [address] [place] [address] ([address] [facility] [address]) You were diagnosed with an injury to your kidney likely due to decreased intake of fluids. You were treated with IV fluids and it was improving. Please drink plenty of water and follow up with your doctor. You have a history of bipolar disorder. You take Depakote and Seroquel. Per records, you follow with Dr. Monique Muro. Please follow up with her. You have a history of substance abuse and current substance abuse believed to have contributed to your altered mental state. Your urine toxicology was positive for benzodiazepenes, cocaine, and THC (cannabis). Please abstain from using illicit substances and continue your treatment at the [facility]. Please drink plenty of fluids and eat a well balanced diet. Also, please take all medications as they are prescribed by your doctor.

## 2017-07-12 NOTE — PROGRESS NOTE ADULT - PROBLEM SELECTOR PLAN 2
- Pt was febrile, tachycardic, and hypertensive on admission without a lactate. Concerned for aspiration pneumonia. No evidence of PNA on chest CT   - Patient received dose of vanc/zosyn  - Continued on unasyn - continue unasyn for presumed aspiration pneumonia in the setting of potential seizure and witnessed aspiration with fever

## 2017-07-12 NOTE — DISCHARGE NOTE ADULT - SECONDARY DIAGNOSIS.
MADDIE (acute kidney injury) Bipolar disorder Substance abuse Nutrition, metabolism, and development symptoms

## 2017-07-12 NOTE — PROGRESS NOTE ADULT - PROBLEM SELECTOR PLAN 1
Patient endorses use of K2 and whiskey yesterday evening, found by [employer] and bystanders to be altered with questionable seizure activity. Utox positive for cocaine, benzos and cannibis. Reports getting follow up for addiction at [facility], they note "2 blunts of K2 use, 1/2 gram of cannibis / day, crack/cocaine, alcohol use 3x per week 1/2 pint"   - EEG ordered Patient endorses use of K2 and whiskey yesterday evening, found by [employer] and bystanders to be altered with questionable seizure activity. Utox positive for cocaine, benzos and cannibis. Reports getting follow up for addiction at [facility], they note "2 blunts of K2 use, 1/2 gram of cannibis / day, crack/cocaine, alcohol use 3x per week 1/2 pint"   - EEG ordered for potential seizure activity   - Does not endorse every day alcohol use, concerned about withdrawal with recent intoxication. Will monitor for signs of withdrawal.

## 2017-07-14 DIAGNOSIS — J69.0 PNEUMONITIS DUE TO INHALATION OF FOOD AND VOMIT: ICD-10-CM

## 2017-07-14 DIAGNOSIS — F19.10 OTHER PSYCHOACTIVE SUBSTANCE ABUSE, UNCOMPLICATED: ICD-10-CM

## 2017-07-14 DIAGNOSIS — R41.82 ALTERED MENTAL STATUS, UNSPECIFIED: ICD-10-CM

## 2017-07-14 DIAGNOSIS — N17.9 ACUTE KIDNEY FAILURE, UNSPECIFIED: ICD-10-CM

## 2017-07-14 DIAGNOSIS — F31.9 BIPOLAR DISORDER, UNSPECIFIED: ICD-10-CM

## 2017-07-17 LAB
CULTURE RESULTS: SIGNIFICANT CHANGE UP
CULTURE RESULTS: SIGNIFICANT CHANGE UP
SPECIMEN SOURCE: SIGNIFICANT CHANGE UP
SPECIMEN SOURCE: SIGNIFICANT CHANGE UP

## 2018-01-09 NOTE — DISCHARGE NOTE ADULT - IF YOU ARE A SMOKER, IT IS IMPORTANT FOR YOUR HEALTH TO STOP SMOKING. PLEASE BE AWARE THAT SECOND HAND SMOKE IS ALSO HARMFUL.
Three views of the left foot, AP, lateral, and oblique, shows what looks like a lucency to the lateral part of the cuboid, anterolateral part of the cuboid possibly representing an intraosseous cyst.  There are some osteoarthritic changes seen to the number four-five TMT joint articulation, mild. Otherwise, I see no osteolytic or osteoblastic lesions. There is some evidence of hallucis valgus alignment in these nonweightbearing films. My overall impression is a possible cyst at the calcaneal cuboid region with some mild osteoarthritic changes to the number four-five TMT joint articulation, mild OA to the left great toe first MTP region. Statement Selected

## 2018-02-02 RX ORDER — QUETIAPINE FUMARATE 200 MG/1
1 TABLET, FILM COATED ORAL
Qty: 0 | Refills: 0 | COMMUNITY

## 2018-02-02 RX ORDER — DIVALPROEX SODIUM 500 MG/1
1 TABLET, DELAYED RELEASE ORAL
Qty: 0 | Refills: 0 | COMMUNITY

## 2018-02-19 ENCOUNTER — EMERGENCY (EMERGENCY)
Facility: HOSPITAL | Age: 38
LOS: 1 days | Discharge: HOME | End: 2018-02-19
Admitting: EMERGENCY MEDICINE

## 2018-02-19 VITALS
OXYGEN SATURATION: 97 % | RESPIRATION RATE: 16 BRPM | DIASTOLIC BLOOD PRESSURE: 92 MMHG | SYSTOLIC BLOOD PRESSURE: 142 MMHG | HEART RATE: 108 BPM | TEMPERATURE: 99 F

## 2018-02-19 DIAGNOSIS — Z11.4 ENCOUNTER FOR SCREENING FOR HUMAN IMMUNODEFICIENCY VIRUS [HIV]: ICD-10-CM

## 2018-02-21 NOTE — ED POST DISCHARGE NOTE - DETAILS
This writer left a message for the patient with the care coordinators information for her to call back

## 2018-05-22 NOTE — ED PROVIDER NOTE - CARDIAC, MLM
I will STOP taking the medications listed below when I get home from the hospital:    Aspirin Enteric Coated 81 mg oral delayed release tablet  -- 1 tab(s) by mouth once a day Normal rate, regular rhythm.  Heart sounds S1, S2.  No murmurs, rubs or gallops.

## 2019-09-28 ENCOUNTER — EMERGENCY (EMERGENCY)
Facility: HOSPITAL | Age: 39
LOS: 1 days | Discharge: HOME | End: 2019-09-28
Attending: EMERGENCY MEDICINE | Admitting: EMERGENCY MEDICINE
Payer: MEDICAID

## 2019-09-28 VITALS
DIASTOLIC BLOOD PRESSURE: 96 MMHG | RESPIRATION RATE: 18 BRPM | TEMPERATURE: 98 F | HEART RATE: 105 BPM | OXYGEN SATURATION: 97 % | SYSTOLIC BLOOD PRESSURE: 133 MMHG

## 2019-09-28 PROBLEM — F31.9 BIPOLAR DISORDER, UNSPECIFIED: Chronic | Status: ACTIVE | Noted: 2017-05-17

## 2019-09-28 PROBLEM — F19.10 OTHER PSYCHOACTIVE SUBSTANCE ABUSE, UNCOMPLICATED: Chronic | Status: ACTIVE | Noted: 2017-05-17

## 2019-09-28 PROBLEM — F31.9 BIPOLAR DISORDER, UNSPECIFIED: Chronic | Status: ACTIVE | Noted: 2017-07-11

## 2019-09-28 LAB
ALBUMIN SERPL ELPH-MCNC: 3.9 G/DL — SIGNIFICANT CHANGE UP (ref 3.4–5)
ALP SERPL-CCNC: 67 U/L — SIGNIFICANT CHANGE UP (ref 40–120)
ALT FLD-CCNC: 15 U/L — SIGNIFICANT CHANGE UP (ref 12–42)
ANION GAP SERPL CALC-SCNC: 8 MMOL/L — LOW (ref 9–16)
AST SERPL-CCNC: 20 U/L — SIGNIFICANT CHANGE UP (ref 15–37)
BASOPHILS NFR BLD AUTO: 0.4 % — SIGNIFICANT CHANGE UP (ref 0–2)
BILIRUB SERPL-MCNC: 0.6 MG/DL — SIGNIFICANT CHANGE UP (ref 0.2–1.2)
BUN SERPL-MCNC: 14 MG/DL — SIGNIFICANT CHANGE UP (ref 7–23)
CALCIUM SERPL-MCNC: 8.9 MG/DL — SIGNIFICANT CHANGE UP (ref 8.5–10.5)
CHLORIDE SERPL-SCNC: 107 MMOL/L — SIGNIFICANT CHANGE UP (ref 96–108)
CO2 SERPL-SCNC: 28 MMOL/L — SIGNIFICANT CHANGE UP (ref 22–31)
CREAT SERPL-MCNC: 1.14 MG/DL — SIGNIFICANT CHANGE UP (ref 0.5–1.3)
D DIMER BLD IA.RAPID-MCNC: 265 NG/ML DDU — HIGH
EOSINOPHIL NFR BLD AUTO: 1 % — SIGNIFICANT CHANGE UP (ref 0–6)
GLUCOSE SERPL-MCNC: 83 MG/DL — SIGNIFICANT CHANGE UP (ref 70–99)
HCT VFR BLD CALC: 46 % — SIGNIFICANT CHANGE UP (ref 39–50)
HGB BLD-MCNC: 16.3 G/DL — SIGNIFICANT CHANGE UP (ref 13–17)
IMM GRANULOCYTES NFR BLD AUTO: 0.1 % — SIGNIFICANT CHANGE UP (ref 0–1.5)
LIDOCAIN IGE QN: 108 U/L — SIGNIFICANT CHANGE UP (ref 73–393)
LYMPHOCYTES # BLD AUTO: 32.3 % — SIGNIFICANT CHANGE UP (ref 13–44)
MCHC RBC-ENTMCNC: 30.8 PG — SIGNIFICANT CHANGE UP (ref 27–34)
MCHC RBC-ENTMCNC: 35.4 G/DL — SIGNIFICANT CHANGE UP (ref 32–36)
MCV RBC AUTO: 86.8 FL — SIGNIFICANT CHANGE UP (ref 80–100)
MONOCYTES NFR BLD AUTO: 5.3 % — SIGNIFICANT CHANGE UP (ref 2–14)
NEUTROPHILS NFR BLD AUTO: 60.9 % — SIGNIFICANT CHANGE UP (ref 43–77)
NT-PROBNP SERPL-SCNC: 15 PG/ML — SIGNIFICANT CHANGE UP
PLATELET # BLD AUTO: 207 K/UL — SIGNIFICANT CHANGE UP (ref 150–400)
POTASSIUM SERPL-MCNC: 3.6 MMOL/L — SIGNIFICANT CHANGE UP (ref 3.5–5.3)
POTASSIUM SERPL-SCNC: 3.6 MMOL/L — SIGNIFICANT CHANGE UP (ref 3.5–5.3)
PROT SERPL-MCNC: 6.7 G/DL — SIGNIFICANT CHANGE UP (ref 6.4–8.2)
RBC # BLD: 5.3 M/UL — SIGNIFICANT CHANGE UP (ref 4.2–5.8)
RBC # FLD: 11.9 % — SIGNIFICANT CHANGE UP (ref 10.3–14.5)
SODIUM SERPL-SCNC: 143 MMOL/L — SIGNIFICANT CHANGE UP (ref 132–145)
TROPONIN I SERPL-MCNC: <0.017 NG/ML — LOW (ref 0.02–0.06)
WBC # BLD: 6.8 K/UL — SIGNIFICANT CHANGE UP (ref 3.8–10.5)
WBC # FLD AUTO: 6.8 K/UL — SIGNIFICANT CHANGE UP (ref 3.8–10.5)

## 2019-09-28 PROCEDURE — 71046 X-RAY EXAM CHEST 2 VIEWS: CPT | Mod: 26

## 2019-09-28 PROCEDURE — 99285 EMERGENCY DEPT VISIT HI MDM: CPT | Mod: 25

## 2019-09-28 PROCEDURE — 93010 ELECTROCARDIOGRAM REPORT: CPT

## 2019-09-28 PROCEDURE — 71275 CT ANGIOGRAPHY CHEST: CPT | Mod: 26

## 2019-09-28 RX ORDER — IBUPROFEN 200 MG
600 TABLET ORAL ONCE
Refills: 0 | Status: COMPLETED | OUTPATIENT
Start: 2019-09-28 | End: 2019-09-28

## 2019-09-28 NOTE — ED PROVIDER NOTE - DIAGNOSTIC INTERPRETATION
Radiology Interpretation performed  by ED Dr. Delgado     CXR - 2 views: Lungs clear, heart shadow normal, bony structures normal, no free air under diaphragm, no PTX.

## 2019-09-28 NOTE — ED PROVIDER NOTE - CARE PROVIDER_API CALL
Chuck Garcia)  Cardiovascular Disease  [address] [facility] [place] [address]  Phone: [phone]  Fax: [phone]  Follow Up Time:

## 2019-09-28 NOTE — ED ADULT NURSE NOTE - NSIMPLEMENTINTERV_GEN_ALL_ED
Implemented All Universal Safety Interventions:  [place] to call system. Call bell, personal items and telephone within reach. Instruct patient to call for assistance. Room bathroom lighting operational. Non-slip footwear when patient is off stretcher. Physically safe environment: no spills, clutter or unnecessary equipment. Stretcher in lowest position, wheels locked, appropriate side rails in place.

## 2019-09-28 NOTE — ED PROVIDER NOTE - OBJECTIVE STATEMENT
40 y/o M with PMHx of endocarditis and bipolar disorder presents to the ED with c/o L arm pain and associated intermittent, "pulling", tight CP x2 weeks. Pt reports he has a Hx of smoking cigarettes for at least 14 years and is currently in the process of quitting. He also states he uses crack, cocaine, and K2. Pt came today to the ED with concerns of possible cardiac related issues. Pt endorses some numbness / tingling, but denies coughs, SOB, N/V, Abd pain, or shoulder pain.     Pt is currently on Seroquel.

## 2019-09-28 NOTE — ED PROVIDER NOTE - CLINICAL SUMMARY MEDICAL DECISION MAKING FREE TEXT BOX
40 y/o M presenting with L arm pain and CP x2 weeks. Plan for CP workup including CXR, Troponin, and D-dimer. EKG is non-ischemic. Will reassess afterwards.

## 2019-09-28 NOTE — ED PROVIDER NOTE - PATIENT PORTAL LINK FT
You can access the FollowMyHealth Patient Portal offered by [facility] by registering at the following website: [email or web address]. By joining [employer]’s FollowMyHealth portal, you will also be able to view your health information using other applications (apps) compatible with our system.

## 2019-09-28 NOTE — ED ADULT TRIAGE NOTE - CHIEF COMPLAINT QUOTE
Pt. c/o chest pain and left arm pain intermittently for a week. Pt. reports being newly sober x2days from crack cocaine and k2

## 2019-10-03 DIAGNOSIS — R07.89 OTHER CHEST PAIN: ICD-10-CM

## 2019-10-03 DIAGNOSIS — M79.602 PAIN IN LEFT ARM: ICD-10-CM

## 2019-10-03 DIAGNOSIS — R20.0 ANESTHESIA OF SKIN: ICD-10-CM

## 2019-12-12 NOTE — ED ADULT NURSE NOTE - OBJECTIVE STATEMENT
Plavix, epoetin les, lasix, lipitor, pepcid, iron Pt. brought in by EMS for altered mental status and as per bystanders pt. was "foaming at the mouth". Pt. is hot to touch. Pt. is aggressive with staff swinging his arms and legs on arrival. Medicated as ordered for patients safety. Plavix, epoetin les, lasix, lipitor, pepcid, toprol xl, iron

## 2020-01-01 NOTE — ED PROVIDER NOTE - ATTESTATION, MLM
I have reviewed and confirmed nurses' notes for patient's medications, allergies, medical history, and surgical history. Statement Selected

## 2021-02-21 NOTE — ED PROVIDER NOTE - NSFOLLOWUPINSTRUCTIONS_ED_ALL_ED_FT
No Chest Pain    Chest pain can be caused by many different conditions which may or may not be dangerous. Causes include heartburn, lung infections, heart attack, blood clot in lungs, skin infections, strain or damage to muscle, cartilage, or bones, etc. In addition to a history and physical examination, an electrocardiogram (ECG) or other lab tests may have been performed to determine the cause of your chest pain. Follow up with your primary care provider or with a cardiologist as instructed.     SEEK IMMEDIATE MEDICAL CARE IF YOU HAVE ANY OF THE FOLLOWING SYMPTOMS: worsening chest pain, coughing up blood, unexplained back/neck/jaw pain, severe abdominal pain, dizziness or lightheadedness, fainting, shortness of breath, sweaty or clammy skin, vomiting, or racing heart beat. These symptoms may represent a serious problem that is an emergency. Do not wait to see if the symptoms will go away. Get medical help right away. Call 911 and do not drive yourself to the hospital.
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
## 2022-08-04 NOTE — ED PROVIDER NOTE - HISTORY ATTESTATION, MLM
Reviewed pre- and post-operative instructions as outlined in the After Visit Summary/Patient Instructions with patient.   Surgery folder was given to patient    Patient Education Topic: Procedure with Dr. Leija     Learner(s): Patient and Significant other/Spouse     Knowledge Level: Basic    Readiness to Learn: Ready    Method:  Verbal explanation and Written material     Outcome: Able to verbalize instructions    Barriers to Learning: No barrier    Covid Testing: patient educated they will need to have a test for the novel Coronavirus, COVID-19.The PCR test needs to be completed within 4 days (96) hours of surgery. . Order Placed.    Scheduling Number: [phone]    NDI/CATA: Confirmation of completion within last 6 months    Patient had the opportunity for questions to be answered. Advised Patient and Significant other/Spouse  to call clinic with any questions/concerns. Gave patient antibacterial soap for pre-surgery skin preparation.      
I have reviewed and confirmed nurses' notes...

## 2022-08-23 NOTE — H&P ADULT - CLICK TO LAUNCH ORM
-- DO NOT REPLY / DO NOT REPLY ALL --  -- Message is from Engagement Center Operations (ECO) --    General Patient Message    ADW Sylvia calling for status physician order for pin needles faxed 8/15 please confirm received and call ALANA Ward back to advise received and please have Dr Gaona sign and fax back to [phone]    Caller Information       Type Contact Phone/Fax    08/23/2022 01:08 PM CDT Phone (Incoming) Sylvia (Other) [phone]     ADW        Alternative phone number: [phone]    Can a detailed message be left? Yes    Message Turnaround:     Did the caller agree that this message can wait until the office reopens in the morning? YES - The Message Can Wait - Send a message to the provider's clinical support pool     [place]:    Please give this turnaround time to the caller:   \"This message will be sent to [state Provider's name]. The clinical team will fulfill your request as soon as they review your message.\"                 .

## 2022-10-02 NOTE — ED ADULT NURSE NOTE - NS ED NURSE DC TEACHING
MD Anne Darby  Internal medicine   History and Physical      CHIEF COMPLAINT: Weakness left upper extremity      HISTORY OF PRESENT ILLNESS:    Patient was seen on the floor earlier this morning at the main campus of [facility] with the ER physician at the time of admission  Data reviewed in detail with her  69-year-old woman resident of a local nursing home  She was sent into emergency room with dysarthria and left upper extremity weakness  Both the symptoms are old and chronic from previous stroke  In addition she was found to be in acute kidney injury with hypernatremia  She was feeding herself this morning sitting up in bed  Denies any specific complaints    Past Medical History:    Past Medical History:   Diagnosis Date    Arthritis     hips    Ataxia     Atrophy of muscle     Bipolar 1 disorder, mixed, moderate (HCC)     Cerebrovascular disease     Coordination problem     Hyperlipidemia     Hypertension     Overactive bladder     Paranoid schizophrenia ([facility] [address]     Thyroid disease        Past Surgical History:    Past Surgical History:   Procedure Laterality Date    BLADDER SUSPENSION      DILATION AND CURETTAGE OF UTERUS      DILATION AND CURETTAGE OF UTERUS N/A 2/16/2021    endometrial biopsy, HYSTEROSCOPY performed by Corie Manjarrez MD at [address] [place] [address]         Medications Prior to Admission:    Medications Prior to Admission: paliperidone palmitate ER (INVEGA SUSTENNA) 156 MG/ML ANAND IM injection, Inject 156 mg into the muscle once Inject 1 dose in the morning starting on the 2nd and ending on the 2nd every month  cariprazine hcl (VRAYLAR) 1.5 MG capsule, Take 1.5 mg by mouth at bedtime  levETIRAcetam (KEPPRA) 500 MG tablet, Take 1 tablet by mouth in the morning and 1 tablet before bedtime. melatonin 5 MG TBDP disintegrating tablet, Take 1 tablet by mouth in the morning for 6 doses.   pantoprazole (PROTONIX) 40 MG tablet, Take 1 tablet by mouth in the morning. [DISCONTINUED] divalproex (DEPAKOTE SPRINKLE) 125 MG capsule, Take 2 capsules by mouth in the morning and 2 capsules before bedtime. Do all this for 6 doses. rivastigmine (EXELON) 13.3 MG/24HR, Place 1 patch onto the skin nightly  acetaminophen (TYLENOL) 650 MG extended release tablet, Take 650 mg by mouth every 4 hours as needed for Pain DO NOT EXCEED 3 GRAMS MAX TYLENOL PER DAY  aspirin 81 MG EC tablet, Take 1 tablet by mouth 2 times daily    Allergies:    Mysoline [primidone] and Tofranil [imipramine hcl]    Social History:    reports that she has never smoked. She has never used smokeless tobacco. She reports that she does not drink alcohol and does not use drugs. Family History:   Family history is unknown by patient. REVIEW OF SYSTEMS:  As above in the HPI, otherwise negative    PHYSICAL EXAM:    Vitals:  /69   Pulse 54   Temp 97.9 °F (36.6 °C) (Oral)   Resp 16   SpO2 92%     General:  Awake, alert, oriented X 3. Chronically ill-appearing  Dysarthria noted  HEENT:  Normocephalic, atraumatic. Pupils equal, round, reactive to light. No scleral icterus. No conjunctival injection. No nasal discharge. Neck:  Supple  Heart:  RRR, no murmurs, gallops, rubs  Lungs:  CTA bilaterally, bilat symmetrical expansion, no wheeze, rales, or rhonchi  Abdomen:   Bowel sounds present, soft, nontender, no masses, no organomegaly, no peritoneal signs  Extremities:  No clubbing, cyanosis, or edema  Skin:  Warm and dry, no open lesions or rash  Neuro:  Cranial nerves 2-12 intact,  Left upper extremity weakness on a chronic basis noted  Breast: deferred  Rectal: deferred  Genitalia:  deferred    LABS:  Data reviewed in detail      ASSESSMENT:      Principal Problem:    Hypernatremia  Acute kidney injury  No new stroke  Comorbidities present and past as listed below  Patient Active Problem List   Diagnosis    Schizophrenia ([facility] [address]    Severe episode of recurrent major depressive disorder, with psychotic features ([facility] [address]    NSAID overdose    Metabolic acidosis with increased anion gap and accumulation of organic acids    Suicide attempt ([facility] [address]    Post-menopausal bleeding    Complicated UTI (urinary tract infection)    Dysarthria    Delirium    Hypernatremia           PLAN:  Admit  Hypotonic IV solution  Resume home medications  Likely back to nursing home tomorrow  Questions answered to her satisfaction    Osmani King MD  10:10 AM  10/2/2022 Other:/polysubstance abuse

## 2023-03-15 NOTE — ED PROVIDER NOTE - GASTROINTESTINAL NEGATIVE STATEMENT, MLM
Discharged ambulatory in stable condition accompanied by wife  
no abdominal pain, no bloating, no constipation, no diarrhea, no nausea and no vomiting.

## 2023-05-01 NOTE — ED PROVIDER NOTE - DIAGNOSIS COUNSELING, MDM
normal appearance , without tenderness upon palpation , no deformities , trachea midline , Thyroid normal size , no masses , thyroid nontender conducted a detailed discussion... I had a detailed discussion with the patient regarding the historical points, exam findings, and any diagnostic results supporting the discharge diagnosis.

## 2023-08-20 NOTE — ED PROVIDER NOTE - DATA REVIEWED, MDM
GLUCOSE 114 12/21/2020 08:47 AM    CALCIUM 9.4 12/30/2020 04:17 AM    CALCIUM 9.4 12/21/2020 08:47 AM         No results found for: ALT, AST     Assessment/Plan:   1. Mild aortic stenosis by prior echocardiogram  - Surveillance echocardiogram in 2025 to 2027    2. CAD in native artery  - Patient with dyspepsia on aspirin  - Discussed with patient that Plavix monotherapy may be superior to aspirin monotherapy as chronic maintenance therapy among patients who had successfully completed the required duration of DAPT therapy post-VALDO PCI (HOST-EXAM study)  - Obtain an CBC  - If CBC is acceptable, start Plavix in lieu of aspirin  - Continue with Crestor and Zetia    3. Paroxysmal SVT (supraventricular tachycardia) (HCC)  - Continue with Coreg    4. Hyperlipidemia, unspecified hyperlipidemia type  - Continue with Crestor and Zetia    5.  Hypertension, unspecified type  - Well-controlled  - Continue with Coreg  - Currently on losartan    F/U: 6 months    Donna Dixon MD vital signs

## 2024-11-21 NOTE — ED ADULT NURSE NOTE - PAIN RATING/NUMBER SCALE (0-10): REST
Last OV relevant to medication: 9/30/24  Last refill date: previous PCP  When pt was asked to return for OV: 9/30/25  Upcoming appt/reason:   Future Appointments   Date Time Provider Department Center   1/7/2025 11:00 AM Hola Yoon MD [facility]   10/6/2025  4:20 PM Cortney Warner MD [facility]   Was pt informed of any over due labs: message sent  Lab Results   Component Value Date    GLU 93 05/01/2024    BUN 5 (L) 05/01/2024    BUNCREA 10.0 07/03/2021    CREATSERUM 0.87 05/01/2024    ANIONGAP 4 05/01/2024    GFR 69 09/11/2016    GFRNAA 68 07/26/2022    GFRAA 78 07/26/2022    CA 9.0 05/01/2024    OSMOCALC 283 05/01/2024    ALKPHO 123 (H) 05/01/2024    AST 13 (L) 05/01/2024    ALT 20 05/01/2024    BILT 0.4 05/01/2024    TP 7.4 05/01/2024    ALB 3.0 (L) 05/01/2024    GLOBULIN 4.4 05/01/2024     05/01/2024    K 4.0 05/01/2024     05/01/2024    CO2 27.0 05/01/2024        0